# Patient Record
Sex: FEMALE | Race: WHITE | Employment: UNEMPLOYED | URBAN - NONMETROPOLITAN AREA
[De-identification: names, ages, dates, MRNs, and addresses within clinical notes are randomized per-mention and may not be internally consistent; named-entity substitution may affect disease eponyms.]

---

## 2023-07-27 ENCOUNTER — HOSPITAL ENCOUNTER (OUTPATIENT)
Dept: PET IMAGING | Age: 78
Discharge: HOME | End: 2023-07-27
Payer: MEDICARE

## 2023-07-27 DIAGNOSIS — C84.90 NK/T-CELL LYMPHOMA, UNSPECIFIED TYPE (HCC): ICD-10-CM

## 2023-07-27 PROCEDURE — 3430000000 HC RX DIAGNOSTIC RADIOPHARMACEUTICAL: Performed by: INTERNAL MEDICINE

## 2023-07-27 PROCEDURE — 78815 PET IMAGE W/CT SKULL-THIGH: CPT

## 2023-07-27 PROCEDURE — A9552 F18 FDG: HCPCS | Performed by: INTERNAL MEDICINE

## 2023-07-27 RX ORDER — FLUDEOXYGLUCOSE F 18 200 MCI/ML
13.42 INJECTION, SOLUTION INTRAVENOUS
Status: COMPLETED | OUTPATIENT
Start: 2023-07-27 | End: 2023-07-27

## 2023-07-27 RX ADMIN — FLUDEOXYGLUCOSE F 18 13.42 MILLICURIE: 200 INJECTION, SOLUTION INTRAVENOUS at 10:00

## 2023-08-02 ENCOUNTER — HOSPITAL ENCOUNTER (OUTPATIENT)
Dept: NON INVASIVE DIAGNOSTICS | Age: 78
Discharge: HOME | End: 2023-08-02
Payer: MEDICARE

## 2023-08-02 DIAGNOSIS — Z01.818 PRE-OPERATIVE CLEARANCE: ICD-10-CM

## 2023-08-02 LAB
LV EF: 63 %
LVEF MODALITY: NORMAL

## 2023-08-02 PROCEDURE — 93306 TTE W/DOPPLER COMPLETE: CPT

## 2023-10-11 ENCOUNTER — TELEPHONE (OUTPATIENT)
Dept: PULMONOLOGY | Age: 78
End: 2023-10-11

## 2023-10-11 ENCOUNTER — OFFICE VISIT (OUTPATIENT)
Dept: PULMONOLOGY | Age: 78
End: 2023-10-11
Payer: MEDICARE

## 2023-10-11 VITALS
OXYGEN SATURATION: 95 % | HEART RATE: 89 BPM | WEIGHT: 180 LBS | RESPIRATION RATE: 20 BRPM | HEIGHT: 66 IN | SYSTOLIC BLOOD PRESSURE: 122 MMHG | DIASTOLIC BLOOD PRESSURE: 64 MMHG | BODY MASS INDEX: 28.93 KG/M2

## 2023-10-11 DIAGNOSIS — C84.00 MYCOSIS FUNGOIDES, UNSPECIFIED BODY REGION (HCC): ICD-10-CM

## 2023-10-11 DIAGNOSIS — R59.0 MEDIASTINAL LYMPHADENOPATHY: Primary | ICD-10-CM

## 2023-10-11 PROCEDURE — 99205 OFFICE O/P NEW HI 60 MIN: CPT | Performed by: INTERNAL MEDICINE

## 2023-10-11 PROCEDURE — 1123F ACP DISCUSS/DSCN MKR DOCD: CPT | Performed by: INTERNAL MEDICINE

## 2023-10-11 RX ORDER — FLUTICASONE FUROATE, UMECLIDINIUM BROMIDE AND VILANTEROL TRIFENATATE 200; 62.5; 25 UG/1; UG/1; UG/1
1 POWDER RESPIRATORY (INHALATION) DAILY
Qty: 2 EACH | Refills: 0 | Status: SHIPPED | COMMUNITY
Start: 2023-10-11

## 2023-10-11 RX ORDER — CLOPIDOGREL BISULFATE 75 MG/1
75 TABLET ORAL DAILY
COMMUNITY

## 2023-10-11 RX ORDER — FLUTICASONE FUROATE AND VILANTEROL 100; 25 UG/1; UG/1
1 POWDER RESPIRATORY (INHALATION) DAILY
COMMUNITY

## 2023-10-11 RX ORDER — DILTIAZEM HYDROCHLORIDE 120 MG/1
TABLET, FILM COATED ORAL
COMMUNITY
Start: 2023-09-28

## 2023-10-11 RX ORDER — FOLIC ACID 1 MG/1
1 TABLET ORAL DAILY
COMMUNITY

## 2023-10-11 RX ORDER — FLUTICASONE FUROATE, UMECLIDINIUM BROMIDE AND VILANTEROL TRIFENATATE 200; 62.5; 25 UG/1; UG/1; UG/1
1 POWDER RESPIRATORY (INHALATION) DAILY
Qty: 1 EACH | Refills: 3 | Status: SHIPPED | OUTPATIENT
Start: 2023-10-11 | End: 2023-10-12

## 2023-10-11 NOTE — TELEPHONE ENCOUNTER
Spoke with pt, scheduled appt this afternoon with Dr. Ihsan Lofton. [facility] doesn't have any imaging on pt. When I spoke with pt I asked here if she has had any imaging done recently and she could only remember coming to [facility]-which would have been for PET scan.

## 2023-10-11 NOTE — PATIENT INSTRUCTIONS
You have been prescribed a dry powder inhaler. You will take 1 quick deep inhalation 1 time  per day. See instructions below       Remember, for a dry powder inhaler it is a quick, deep inhalation over about 1 second. For a traditional MDI puffer such as albuterol puffers, it is a slow deep inhalation over 3 to 5 seconds. Bronchoscopy has been set up for __________ arrival time __________ at [facility]. Please enter at [facility]. Nothing to eat or drink after midnight prior to the procedure. Must have a  to bring you to and from the procedure. Hold blood thinners as instructed prior to the procedure.

## 2023-10-11 NOTE — TELEPHONE ENCOUNTER
Pt needs to be scheduled for bronch with EBUS. Look for 10/20. Call dept tomorrow to schedule. Pt has prep, just needs date/time and to make a follow up after bronch.

## 2023-10-11 NOTE — TELEPHONE ENCOUNTER
See Dr. Sandi Valverde this week, can offer today. Please ask [facility] if they have any recent CT Chest imaging & to push to PACS (+ send reports) if so    Recent PET CT with bilateral non-calcified nodules with low avidity. Referred to pulm for bronch with EBUS to r/o infectious vs lymphoma involvement. TB blood test advised aong with an ACE level.    Follows with Dr. Aramis Zimmerman for Peripheral T cell non-hodgkins lymphoma, mycosis fungoides   Having increased CAREY & cough, 2D echo non-revealing, EF 65%

## 2023-10-12 NOTE — TELEPHONE ENCOUNTER
Scheduled pt for bronch 10/20/23 at 8:00 am, arrival time 7:00 am.  Spoke with pt and informed with verbal understanding. Need to watch for CT results 10/17/23 for Dr. Marie Lambert to plan bronch.

## 2023-10-12 NOTE — PROGRESS NOTES
..1.  Do not eat or drink anything after 12 midnight prior to surgery. This includes no water, chewing gum or mints, except for bowel prep complete per MD.  2.  Take the following pills with a small sip of water on the morning of surgery 10/20/2023.  3.  Aspirin, Ibuprofen, Advil, Naproxen, Vitamin E and other Anti-inflammatory products should be stopped for 5 days before surgery or as directed by your physician. 4.  Check with your doctor regarding stopping Plavix, Coumadin, Lovenox, Fragmin or other blood thinners. 5.  Do not smoke and do not drink alcoholic beverages 24 hours prior to surgery. This includes NA Beer. 6.  You may brush your teeth and gargle the morning of surgery. DO NOT SWALLOW WATER.  7.  You MUST make arrangements for a responsible adult to take you home after your surgery. You will not be allowed to leave alone or drive yourself home. It is strongly suggested someone stay with you the first 24 hours. Your surgery will be cancelled if you do not have a ride home. 8.  A parent/legal guardian must accompany a child scheduled for surgery and plan to stay at the hospital until the child is discharged. Please do not bring other children with you. 9.  Please wear simple, loose fitting clothing to the hospital.  Gemma Abide not bring valuables ( money, credit cards, checkbooks, etc.)  Do not wear any makeup (including no eye makeup) or nail polish on your fingers or toes. 10.  Do not wear any jewelry or piercing on the day of surgery. All body piercing jewelry must be removed. 11.  If you have dentures, they will be removed before going to the OR; we will provide you a container. If you wear contact lenses or glasses, they will be removed; please bring a case for them.   15.  Notify your Surgeon if you develop any illness between now and surgery time; cough, cold, fever, sore throat, nausea, vomiting, etc.  Please notify your surgeon if you experience dizziness, shortness of breath or blurred vision between now and the time of your surgery. To provide excellent care, visitors will be limited to two in a room at any given time. Please no children under the age of 15 in the surgical department.

## 2023-10-12 NOTE — PROGRESS NOTES
Visit note faxed to PCP and Dr. Jessica Quevedo.
etc..  . The risks and benefits of fiberoptic bronchoscopy were specifically discussed, including the goal of obtaining a diagnosis, the risks of bleeding, infection, pneumothorax, lung collapse, hospitalization and death. We specifically discussed the potential for biopsy and complications related to biopsy. We also discussed the risks of sedation/anesthesia. It is my assessment that the risk of the invasive procedure is outweighed by the benefit. Patient was counseled regarding the recommended procedure, alternatives to the procedure, and possible consequences of not having any evaluation performed.

## 2023-10-13 ENCOUNTER — ANESTHESIA EVENT (OUTPATIENT)
Dept: ENDOSCOPY | Age: 78
End: 2023-10-13
Payer: MEDICARE

## 2023-10-19 ENCOUNTER — HOSPITAL ENCOUNTER (OUTPATIENT)
Age: 78
Discharge: HOME | End: 2023-10-19
Payer: MEDICARE

## 2023-10-19 ENCOUNTER — HOSPITAL ENCOUNTER (OUTPATIENT)
Dept: CT IMAGING | Age: 78
Discharge: HOME | End: 2023-10-19
Payer: MEDICARE

## 2023-10-19 DIAGNOSIS — C84.00 MYCOSIS FUNGOIDES, UNSPECIFIED BODY REGION (HCC): ICD-10-CM

## 2023-10-19 DIAGNOSIS — R59.0 MEDIASTINAL LYMPHADENOPATHY: ICD-10-CM

## 2023-10-19 LAB
ALBUMIN SERPL-MCNC: 4.6 G/DL (ref 3.4–5)
ANION GAP SERPL CALCULATED.3IONS-SCNC: 10 MMOL/L (ref 3–16)
BASOPHILS # BLD: 0.1 K/UL (ref 0–0.2)
BASOPHILS NFR BLD: 1 %
BUN SERPL-MCNC: 15 MG/DL (ref 7–20)
CALCIUM SERPL-MCNC: 9.9 MG/DL (ref 8.3–10.6)
CHLORIDE SERPL-SCNC: 101 MMOL/L (ref 99–110)
CO2 SERPL-SCNC: 29 MMOL/L (ref 21–32)
CREAT SERPL-MCNC: 0.7 MG/DL (ref 0.6–1.2)
DEPRECATED RDW RBC AUTO: 13.5 % (ref 12.4–15.4)
EOSINOPHIL # BLD: 0.3 K/UL (ref 0–0.6)
EOSINOPHIL NFR BLD: 5.5 %
GFR SERPLBLD CREATININE-BSD FMLA CKD-EPI: >60 ML/MIN/{1.73_M2}
GLUCOSE SERPL-MCNC: 99 MG/DL (ref 70–99)
HCT VFR BLD AUTO: 41.1 % (ref 36–48)
HGB BLD-MCNC: 14 G/DL (ref 12–16)
LYMPHOCYTES # BLD: 1.3 K/UL (ref 1–5.1)
LYMPHOCYTES NFR BLD: 22.9 %
MCH RBC QN AUTO: 31 PG (ref 26–34)
MCHC RBC AUTO-ENTMCNC: 34.1 G/DL (ref 31–36)
MCV RBC AUTO: 90.8 FL (ref 80–100)
MONOCYTES # BLD: 0.4 K/UL (ref 0–1.3)
MONOCYTES NFR BLD: 7.7 %
NEUTROPHILS # BLD: 3.5 K/UL (ref 1.7–7.7)
NEUTROPHILS NFR BLD: 62.9 %
PHOSPHATE SERPL-MCNC: 4 MG/DL (ref 2.5–4.9)
PLATELET # BLD AUTO: 217 K/UL (ref 135–450)
PMV BLD AUTO: 6.5 FL (ref 5–10.5)
POTASSIUM SERPL-SCNC: 4.7 MMOL/L (ref 3.5–5.1)
RBC # BLD AUTO: 4.53 M/UL (ref 4–5.2)
SODIUM SERPL-SCNC: 140 MMOL/L (ref 136–145)
WBC # BLD AUTO: 5.5 K/UL (ref 4–11)

## 2023-10-19 PROCEDURE — 6360000004 HC RX CONTRAST MEDICATION: Performed by: INTERNAL MEDICINE

## 2023-10-19 PROCEDURE — 71260 CT THORAX DX C+: CPT

## 2023-10-19 PROCEDURE — 80069 RENAL FUNCTION PANEL: CPT

## 2023-10-19 PROCEDURE — 85025 COMPLETE CBC W/AUTO DIFF WBC: CPT

## 2023-10-19 PROCEDURE — 36415 COLL VENOUS BLD VENIPUNCTURE: CPT

## 2023-10-19 RX ADMIN — IOMEPROL INJECTION 80 ML: 714 INJECTION, SOLUTION INTRAVASCULAR at 16:57

## 2023-10-19 NOTE — H&P
Fiberoptic bronchoscopy history:     Patient with mycosis funcoides; requires fiberoptic bronchoscopy with biopsy to rule out atypical infections, lymphoma per oncology request.    Patient is allergic to aspirin, codeine, and quinine derivatives. Past Medical History:   Diagnosis Date    Allergic rhinitis     Arthritis     all over    Blood transfusion     with gb surgery    Diaphragmatic hernia without mention of obstruction or gangrene     Hiatal hernia    Esophageal reflux     Gastroesophageal reflux    Hearing loss     Irritable bowel syndrome     Irritable bowel    MVP (mitral valve prolapse)     Screening colonoscopy 2/11    Sinus drainage     Tachyarrhythmia about 30 years    well-controlled with nadol    Wears dentures     Wears glasses        Past Surgical History:   Procedure Laterality Date    BACK SURGERY      cleaned out bones    CARPAL TUNNEL RELEASE Bilateral     CHOLECYSTECTOMY, LAPAROSCOPIC  age 39    COLONOSCOPY      DENTAL SURGERY  2010    lower right attached to jaw    HYSTERECTOMY (CERVIX STATUS UNKNOWN)      UPPER GASTROINTESTINAL ENDOSCOPY  3-       Allergies   Allergen Reactions    Aspirin Swelling    Codeine     Quinine Derivatives Diarrhea and Swelling       No current facility-administered medications on file prior to encounter.      Current Outpatient Medications on File Prior to Encounter   Medication Sig Dispense Refill    clopidogrel (PLAVIX) 75 MG tablet Take 1 tablet by mouth daily Not currently taking (Patient not taking: Reported on 61/76/5925)      folic acid (FOLVITE) 1 MG tablet Take 1 tablet by mouth daily      B Complex Vitamins (B-COMPLEX/B-12 PO) Take by mouth      dilTIAZem (CARDIZEM) 120 MG tablet       fluticasone furoate-vilanterol (BREO ELLIPTA) 100-25 MCG/ACT inhaler Inhale 1 puff into the lungs daily      Chlorpheniramine Maleate (ALLERGY PO) Take 2 tablets by mouth daily      fluticasone-umeclidin-vilant (TRELEGY ELLIPTA) 200-62.5-25 MCG/ACT AEPB inhaler Inhale 1 puff into the lungs daily 2 each 0    metoprolol (TOPROL-XL) 50 MG XL tablet Take 1 tablet by mouth 3 times daily      pantoprazole (PROTONIX) 40 MG tablet Take 1 tablet by mouth daily 30 tablet 3    fluticasone (FLONASE) 50 MCG/ACT nasal spray 2 sprays by Nasal route daily      etodolac (LODINE XL) 400 MG SR tablet Take 400 mg by mouth 2 times daily. sertraline (ZOLOFT) 50 MG tablet Take 1.5 tablets by mouth daily          reports that she has never smoked. She has never used smokeless tobacco.    family history includes Cancer in her brother and daughter. Height 1.676 m (5' 6\"), weight 81.6 kg (180 lb), not currently breastfeeding. HENT: Airway patent and reviewed. Mallampati 2  Cardiovascular: Normal rate, regular rhythm, normal heart sounds. Pulmonary/Chest: No increased work of breathing. CTA bilaterally  Abdominal: Soft. No distension. ASA CLASS      III. Severe Systemic Disease      Sedation plan: Anesthesia      Post Procedure Plan   Return to same level of care   ______________________     The risks and benefits of fiberoptic bronchoscopy were specifically discussed, including the goal of obtaining a diagnosis, the risks of bleeding, infection, pneumothorax, lung collapse, hospitalization and death. We specifically discussed the potential for biopsy and complications related to biopsy. We also discussed the risks of sedation/anesthesia. It is my assessment that the risk of the invasive procedure is outweighed by the benefit. Patient was counseled regarding the recommended procedure, alternatives to the procedure, and possible consequences of not having any evaluation performed.

## 2023-10-19 NOTE — TELEPHONE ENCOUNTER
Pt forgot about Ct scan and it wasn't done. Spoke with pt and was able to reschedule for today at 4 pm at [facility].

## 2023-10-20 ENCOUNTER — ANESTHESIA (OUTPATIENT)
Dept: ENDOSCOPY | Age: 78
End: 2023-10-20
Payer: MEDICARE

## 2023-10-20 ENCOUNTER — HOSPITAL ENCOUNTER (OUTPATIENT)
Age: 78
Setting detail: OUTPATIENT SURGERY
Discharge: HOME | End: 2023-10-20
Attending: INTERNAL MEDICINE | Admitting: INTERNAL MEDICINE
Payer: MEDICARE

## 2023-10-20 VITALS
RESPIRATION RATE: 15 BRPM | WEIGHT: 180 LBS | BODY MASS INDEX: 28.93 KG/M2 | SYSTOLIC BLOOD PRESSURE: 114 MMHG | TEMPERATURE: 97.2 F | OXYGEN SATURATION: 100 % | HEIGHT: 66 IN | DIASTOLIC BLOOD PRESSURE: 68 MMHG | HEART RATE: 60 BPM

## 2023-10-20 PROBLEM — C84.00 MYCOSIS FUNGOIDES (HCC): Status: ACTIVE | Noted: 2023-10-20

## 2023-10-20 PROBLEM — R59.0 MEDIASTINAL LYMPHADENOPATHY: Status: ACTIVE | Noted: 2023-10-20

## 2023-10-20 LAB
ANION GAP SERPL CALCULATED.3IONS-SCNC: 11 MMOL/L (ref 3–16)
APPEARANCE BRONCH: ABNORMAL
BUN SERPL-MCNC: 14 MG/DL (ref 7–20)
CALCIUM SERPL-MCNC: 9.2 MG/DL (ref 8.3–10.6)
CHLORIDE SERPL-SCNC: 103 MMOL/L (ref 99–110)
CLOT SPEC QL: ABNORMAL
CO2 SERPL-SCNC: 24 MMOL/L (ref 21–32)
COLOR BRONCH: ABNORMAL
CREAT SERPL-MCNC: 0.6 MG/DL (ref 0.6–1.2)
GFR SERPLBLD CREATININE-BSD FMLA CKD-EPI: >60 ML/MIN/{1.73_M2}
GLUCOSE SERPL-MCNC: 96 MG/DL (ref 70–99)
LYMPHOCYTES NFR BRONCH MANUAL: 24 % (ref 5–10)
MACROPHAGES NFR BRONCH MANUAL: 37 % (ref 90–95)
NEUTROPHILS NFR BRONCH MANUAL: 39 % (ref 5–10)
NUC CELL # BRONCH MANUAL: 78 /CUMM
POTASSIUM SERPL-SCNC: 3.8 MMOL/L (ref 3.5–5.1)
RBC # FLD MANUAL: ABNORMAL /CUMM
SODIUM SERPL-SCNC: 138 MMOL/L (ref 136–145)
TOTAL CELLS COUNTED BRONCH: 100

## 2023-10-20 PROCEDURE — 80048 BASIC METABOLIC PNL TOTAL CA: CPT

## 2023-10-20 PROCEDURE — 87186 SC STD MICRODIL/AGAR DIL: CPT

## 2023-10-20 PROCEDURE — 31624 DX BRONCHOSCOPE/LAVAGE: CPT | Performed by: INTERNAL MEDICINE

## 2023-10-20 PROCEDURE — 7100000011 HC PHASE II RECOVERY - ADDTL 15 MIN: Performed by: INTERNAL MEDICINE

## 2023-10-20 PROCEDURE — 87070 CULTURE OTHR SPECIMN AEROBIC: CPT

## 2023-10-20 PROCEDURE — 88177 CYTP FNA EVAL EA ADDL: CPT

## 2023-10-20 PROCEDURE — 88184 FLOWCYTOMETRY/ TC 1 MARKER: CPT

## 2023-10-20 PROCEDURE — 87205 SMEAR GRAM STAIN: CPT

## 2023-10-20 PROCEDURE — 88173 CYTOPATH EVAL FNA REPORT: CPT

## 2023-10-20 PROCEDURE — 6360000002 HC RX W HCPCS: Performed by: NURSE ANESTHETIST, CERTIFIED REGISTERED

## 2023-10-20 PROCEDURE — 89051 BODY FLUID CELL COUNT: CPT

## 2023-10-20 PROCEDURE — 87206 SMEAR FLUORESCENT/ACID STAI: CPT

## 2023-10-20 PROCEDURE — 3700000001 HC ADD 15 MINUTES (ANESTHESIA): Performed by: INTERNAL MEDICINE

## 2023-10-20 PROCEDURE — 3603165200 HC BRNCHSC EBUS GUIDED SAMPL 1/2 NODE STATION/STRUX: Performed by: INTERNAL MEDICINE

## 2023-10-20 PROCEDURE — 2709999900 HC NON-CHARGEABLE SUPPLY: Performed by: INTERNAL MEDICINE

## 2023-10-20 PROCEDURE — 87116 MYCOBACTERIA CULTURE: CPT

## 2023-10-20 PROCEDURE — 87102 FUNGUS ISOLATION CULTURE: CPT

## 2023-10-20 PROCEDURE — 3609010800 HC BRONCHOSCOPY ALVEOLAR LAVAGE: Performed by: INTERNAL MEDICINE

## 2023-10-20 PROCEDURE — 7100000001 HC PACU RECOVERY - ADDTL 15 MIN: Performed by: INTERNAL MEDICINE

## 2023-10-20 PROCEDURE — 7100000000 HC PACU RECOVERY - FIRST 15 MIN: Performed by: INTERNAL MEDICINE

## 2023-10-20 PROCEDURE — 2720000010 HC SURG SUPPLY STERILE: Performed by: INTERNAL MEDICINE

## 2023-10-20 PROCEDURE — 3609011200 HC BRONCHOSCOPY W/TRANSBRONCL NDL ASPIR BX EA ADDL LOBE: Performed by: INTERNAL MEDICINE

## 2023-10-20 PROCEDURE — 7100000010 HC PHASE II RECOVERY - FIRST 15 MIN: Performed by: INTERNAL MEDICINE

## 2023-10-20 PROCEDURE — 88112 CYTOPATH CELL ENHANCE TECH: CPT

## 2023-10-20 PROCEDURE — 2500000003 HC RX 250 WO HCPCS: Performed by: NURSE ANESTHETIST, CERTIFIED REGISTERED

## 2023-10-20 PROCEDURE — 87181 SC STD AGAR DILUTION PER AGT: CPT

## 2023-10-20 PROCEDURE — 88185 FLOWCYTOMETRY/TC ADD-ON: CPT

## 2023-10-20 PROCEDURE — 2500000003 HC RX 250 WO HCPCS: Performed by: ANESTHESIOLOGY

## 2023-10-20 PROCEDURE — 88312 SPECIAL STAINS GROUP 1: CPT

## 2023-10-20 PROCEDURE — 36415 COLL VENOUS BLD VENIPUNCTURE: CPT

## 2023-10-20 PROCEDURE — 3700000000 HC ANESTHESIA ATTENDED CARE: Performed by: INTERNAL MEDICINE

## 2023-10-20 PROCEDURE — 31653 BRONCH EBUS SAMPLNG 3/> NODE: CPT | Performed by: INTERNAL MEDICINE

## 2023-10-20 PROCEDURE — 3609011900 HC BRONCHOSCOPY NEEDLE BX TRACHEA MAIN STEM&/BRON: Performed by: INTERNAL MEDICINE

## 2023-10-20 PROCEDURE — 87077 CULTURE AEROBIC IDENTIFY: CPT

## 2023-10-20 PROCEDURE — 88172 CYTP DX EVAL FNA 1ST EA SITE: CPT

## 2023-10-20 PROCEDURE — 88305 TISSUE EXAM BY PATHOLOGIST: CPT

## 2023-10-20 RX ORDER — LABETALOL HYDROCHLORIDE 5 MG/ML
5 INJECTION, SOLUTION INTRAVENOUS EVERY 10 MIN PRN
Status: DISCONTINUED | OUTPATIENT
Start: 2023-10-20 | End: 2023-10-20 | Stop reason: HOSPADM

## 2023-10-20 RX ORDER — LIDOCAINE HYDROCHLORIDE 20 MG/ML
INJECTION, SOLUTION EPIDURAL; INFILTRATION; INTRACAUDAL; PERINEURAL PRN
Status: DISCONTINUED | OUTPATIENT
Start: 2023-10-20 | End: 2023-10-20 | Stop reason: SDUPTHER

## 2023-10-20 RX ORDER — SODIUM CHLORIDE 0.9 % (FLUSH) 0.9 %
5-40 SYRINGE (ML) INJECTION EVERY 12 HOURS SCHEDULED
Status: DISCONTINUED | OUTPATIENT
Start: 2023-10-20 | End: 2023-10-20 | Stop reason: HOSPADM

## 2023-10-20 RX ORDER — ONDANSETRON 2 MG/ML
INJECTION INTRAMUSCULAR; INTRAVENOUS PRN
Status: DISCONTINUED | OUTPATIENT
Start: 2023-10-20 | End: 2023-10-20 | Stop reason: SDUPTHER

## 2023-10-20 RX ORDER — ROCURONIUM BROMIDE 10 MG/ML
INJECTION, SOLUTION INTRAVENOUS PRN
Status: DISCONTINUED | OUTPATIENT
Start: 2023-10-20 | End: 2023-10-20 | Stop reason: SDUPTHER

## 2023-10-20 RX ORDER — MEPERIDINE HYDROCHLORIDE 25 MG/ML
12.5 INJECTION INTRAMUSCULAR; INTRAVENOUS; SUBCUTANEOUS EVERY 5 MIN PRN
Status: DISCONTINUED | OUTPATIENT
Start: 2023-10-20 | End: 2023-10-20 | Stop reason: HOSPADM

## 2023-10-20 RX ORDER — SODIUM CHLORIDE, SODIUM LACTATE, POTASSIUM CHLORIDE, CALCIUM CHLORIDE 600; 310; 30; 20 MG/100ML; MG/100ML; MG/100ML; MG/100ML
INJECTION, SOLUTION INTRAVENOUS CONTINUOUS
Status: DISCONTINUED | OUTPATIENT
Start: 2023-10-20 | End: 2023-10-20 | Stop reason: HOSPADM

## 2023-10-20 RX ORDER — DIPHENHYDRAMINE HYDROCHLORIDE 50 MG/ML
12.5 INJECTION INTRAMUSCULAR; INTRAVENOUS
Status: DISCONTINUED | OUTPATIENT
Start: 2023-10-20 | End: 2023-10-20 | Stop reason: HOSPADM

## 2023-10-20 RX ORDER — SODIUM CHLORIDE 9 MG/ML
INJECTION, SOLUTION INTRAVENOUS PRN
Status: DISCONTINUED | OUTPATIENT
Start: 2023-10-20 | End: 2023-10-20 | Stop reason: HOSPADM

## 2023-10-20 RX ORDER — SODIUM CHLORIDE 0.9 % (FLUSH) 0.9 %
5-40 SYRINGE (ML) INJECTION PRN
Status: DISCONTINUED | OUTPATIENT
Start: 2023-10-20 | End: 2023-10-20 | Stop reason: HOSPADM

## 2023-10-20 RX ORDER — ONDANSETRON 2 MG/ML
4 INJECTION INTRAMUSCULAR; INTRAVENOUS
Status: DISCONTINUED | OUTPATIENT
Start: 2023-10-20 | End: 2023-10-20 | Stop reason: HOSPADM

## 2023-10-20 RX ORDER — DEXAMETHASONE SODIUM PHOSPHATE 10 MG/ML
INJECTION, SOLUTION INTRAMUSCULAR; INTRAVENOUS PRN
Status: DISCONTINUED | OUTPATIENT
Start: 2023-10-20 | End: 2023-10-20 | Stop reason: SDUPTHER

## 2023-10-20 RX ORDER — PROPOFOL 10 MG/ML
INJECTION, EMULSION INTRAVENOUS PRN
Status: DISCONTINUED | OUTPATIENT
Start: 2023-10-20 | End: 2023-10-20 | Stop reason: SDUPTHER

## 2023-10-20 RX ORDER — OXYCODONE HYDROCHLORIDE 5 MG/1
5 TABLET ORAL PRN
Status: DISCONTINUED | OUTPATIENT
Start: 2023-10-20 | End: 2023-10-20 | Stop reason: HOSPADM

## 2023-10-20 RX ORDER — FAMOTIDINE 10 MG/ML
20 INJECTION, SOLUTION INTRAVENOUS ONCE
Status: COMPLETED | OUTPATIENT
Start: 2023-10-20 | End: 2023-10-20

## 2023-10-20 RX ORDER — OXYCODONE HYDROCHLORIDE 5 MG/1
10 TABLET ORAL PRN
Status: DISCONTINUED | OUTPATIENT
Start: 2023-10-20 | End: 2023-10-20 | Stop reason: HOSPADM

## 2023-10-20 RX ADMIN — FAMOTIDINE 20 MG: 10 INJECTION, SOLUTION INTRAVENOUS at 08:01

## 2023-10-20 RX ADMIN — LIDOCAINE HYDROCHLORIDE 3 ML: 20 INJECTION, SOLUTION EPIDURAL; INFILTRATION; INTRACAUDAL; PERINEURAL at 08:09

## 2023-10-20 RX ADMIN — DEXAMETHASONE SODIUM PHOSPHATE 8 MG: 10 INJECTION, SOLUTION INTRAMUSCULAR; INTRAVENOUS at 08:19

## 2023-10-20 RX ADMIN — ONDANSETRON HYDROCHLORIDE 4 MG: 2 INJECTION, SOLUTION INTRAMUSCULAR; INTRAVENOUS at 09:16

## 2023-10-20 RX ADMIN — PROPOFOL 130 MG: 10 INJECTION, EMULSION INTRAVENOUS at 08:09

## 2023-10-20 RX ADMIN — ROCURONIUM BROMIDE 50 MG: 10 INJECTION, SOLUTION INTRAVENOUS at 08:09

## 2023-10-20 ASSESSMENT — PAIN - FUNCTIONAL ASSESSMENT: PAIN_FUNCTIONAL_ASSESSMENT: NONE - DENIES PAIN

## 2023-10-20 NOTE — PROCEDURES
PROCEDURE: Fiberoptic bronchoscopy with endobronchial ultrasound-guided biopsy of lymph nodes    DESCRIPTION OF PROCEDURE: Informed consent was obtained from the patient after explaining the risks and benefits. A time out was taken. Anesthesia was kindly provided by the anesthesia team.    The scope was passed with ease via the endotracheal tube. Direct visualization of the lymph nodes was obtained using endobronchial ultrasound (EBUS) guidance. A complete ultrasound lymph node exam was performed for lymph node stations 4, 7, 10 and 11. The following lymph nodes were subjected to EBUS guided biopsy using standard technique and in the following order:    1.  7 (approximately 1 cm in short axis)  2.  4R (approximately 0.75 cm in short axis)  3.  11R (approximately 1 cm in short axis)    Subsequently, a standard bronchoscope was used to perform a complete airway inspection. 1.  Washings were obtained from throughout the airways. 2.  A bronchoalveolar lavage was obtained from the right upper lobe     Rapid on site evaluation of tissue was performed by pathologist.  Lymphocytes and anthracotic pigment was seen on the slides from specimens 1&2, indicating lymphatic tissue. Samples were sent for cytology, flow cytometry and culture. The patient tolerated the procedure well. EBL less than 10 mL. Recovery will be per the anesthesia team.      FOLLOW UP:  is with me in approximately three to five days. Patient is instructed to call with concerns and if follow up has not already been scheduled. In the event of severe symptoms or if the patient is unable to reach my office, instructions are given to proceed to the emergency department.

## 2023-10-20 NOTE — ANESTHESIA POSTPROCEDURE EVALUATION
HGB                      14.0                10/19/2023 03:40 PM        HCT                      41.1                10/19/2023 03:40 PM        PLT                      217                 10/19/2023 03:40 PM   RENAL  Lab Results       Component                Value               Date/Time                  NA                       138                 10/20/2023 08:02 AM        K                        3.8                 10/20/2023 08:02 AM        CL                       103                 10/20/2023 08:02 AM        CO2                      24                  10/20/2023 08:02 AM        BUN                      14                  10/20/2023 08:02 AM        CREATININE               0.6                 10/20/2023 08:02 AM        GLUCOSE                  96                  10/20/2023 08:02 AM   COAGS  Lab Results       Component                Value               Date/Time                  PROTIME                  9.9                 09/23/2014 12:22 PM        INR                      0.92                09/23/2014 12:22 PM        APTT                     28.3                09/23/2014 12:22 PM     Intake & Output:  @75ZDKV@    Nausea & Vomiting:  No    Level of Consciousness:  Awake    Pain Assessment:  Adequate analgesia    Anesthesia Complications:  No apparent anesthetic complications    SUMMARY      Vital signs stable  OK to discharge from Stage I post anesthesia care.   Care transferred from Anesthesiology department on discharge from perioperative area

## 2023-10-20 NOTE — PROGRESS NOTES
Discharge instructions given to patient's  Noah Anna at this time, he states understanding and denies any questions.

## 2023-10-21 LAB
ACID FAST STN SPEC QL: NORMAL
LOEFFLER MB STN SPEC: NORMAL

## 2023-10-22 LAB
BACTERIA SPEC RESP CULT: ABNORMAL
BACTERIA SPEC RESP CULT: ABNORMAL
BACTERIA SPEC RESP CULT: NORMAL
GRAM STN SPEC: ABNORMAL
GRAM STN SPEC: NORMAL
ORGANISM: ABNORMAL

## 2023-10-24 ENCOUNTER — OFFICE VISIT (OUTPATIENT)
Dept: PULMONOLOGY | Age: 78
End: 2023-10-24
Payer: MEDICARE

## 2023-10-24 VITALS
HEART RATE: 84 BPM | RESPIRATION RATE: 16 BRPM | HEIGHT: 66 IN | WEIGHT: 176 LBS | BODY MASS INDEX: 28.28 KG/M2 | OXYGEN SATURATION: 97 % | DIASTOLIC BLOOD PRESSURE: 68 MMHG | SYSTOLIC BLOOD PRESSURE: 110 MMHG

## 2023-10-24 DIAGNOSIS — R91.1 PULMONARY NODULE: Primary | ICD-10-CM

## 2023-10-24 LAB
BACTERIA SPEC RESP CULT: ABNORMAL
BACTERIA SPEC RESP CULT: ABNORMAL
GRAM STN SPEC: ABNORMAL
ORGANISM: ABNORMAL

## 2023-10-24 PROCEDURE — 1123F ACP DISCUSS/DSCN MKR DOCD: CPT | Performed by: INTERNAL MEDICINE

## 2023-10-24 PROCEDURE — 99214 OFFICE O/P EST MOD 30 MIN: CPT | Performed by: INTERNAL MEDICINE

## 2023-10-24 NOTE — PROGRESS NOTES
PULMONARY, CRITICAL CARE AND SLEEP MEDICINE     CC/REFERRING PROVIDER:  Patient is being seen at the request of Dr. Savi Louis for a consultation for abnormal CTPET      Interval History 10/24/23  - feels weak and poorly overall, maybe worse since anesthesia; has significant nighttime cough, doesn't think breo helps, trelegy caused reflux she thinks. PRESENTING HPI 10/11/23 : 78 yo with T cell lymphoma/mycosis fungoides who is going to be treated with immunosuppressing therapies, we are asked to evaluate for possible NHL, AFB, other infection. reports that she has never smoked. She has never used smokeless tobacco.      PHYSICAL EXAM:  Blood pressure 110/68, pulse 84, resp. rate 16, height 1.676 m (5' 6\"), weight 79.8 kg (176 lb), SpO2 97 %, not currently breastfeeding.'  CONSTITUTIONAL:  No acute distress. HEENT:  PERRL. Oropharynx is clear and moist.   CV: Normal S1, S2. No lower extremity edema. PULM/CHEST: No wheezes. No rales. Chest wall is not dull to percussion. No accessory muscle usage or stridor. ABDOMINAL:   No distension   MUSCULOSKELETAL: No cyanosis. No clubbing. No obvious joint deformity. LYMPHATIC: No cervical or supraclavicular adenopathy. SKIN: Skin is warm and dry. Rash of MF on LE   PSYCHIATRIC: Normal mood and affect. NEUROLOGIC: Alert, awake and oriented. Speech fluent    DATA:   - PFTs -       - Imaging -   CT PET 7/27/23 personally reviewed   CHEST: Trace pericardial effusion. Calcification of the thoracic aorta. Calcified mediastinal lymph nodes    Biapical pleuroparenchymal thickening. Tree-in-bud type nodularity within  the right upper lobe is similar to prior, suggestive of bronchiolitis. 6 mm anterior left apical pulmonary nodule is similar to prior. 3 mm peripheral subpleural left upper lobe nodule appears new though is too small to characterize by PET. IMPRESSION:  No hypermetabolic adenopathy, with regard to the given history of lymphoma.    Several

## 2023-10-24 NOTE — RESULT ENCOUNTER NOTE
Please forward bacterial/AFB/fungal culture prelim results to [facility], please send Flow and cytology to [facility]     Please tell pt that her pathology results all look okay, no sign of cancer or lymphoma. Also, her cultures looking at infections that would prevent her from treatments with  [facility] are so far negative. We will continue to follow those cultures for 4 additional weeks and notify her if there are any changes. There was a very small amount of a bacteria present that sometimes can cause pneumonia, so I would like to treat that with a short course of levaquin.   I sent the rx in

## 2023-10-25 ENCOUNTER — TELEPHONE (OUTPATIENT)
Dept: PULMONOLOGY | Age: 78
End: 2023-10-25

## 2023-10-25 DIAGNOSIS — R91.1 PULMONARY NODULE: Primary | ICD-10-CM

## 2023-10-25 NOTE — TELEPHONE ENCOUNTER
Faxed visit note and testing to Dr. Laurie Presley. Need to inform pt of Dr. Deng Her message and schedule appropriate Ct and follow up. Message  Received: Roselia Mercer, MD Kailash Castro MA; [place], [place], [place]; [place], [place]  CC: Laurie Presley on my note and letter from today, all cytology results, culture results     Please tell patient I would like her to schedule a f/u CT CHEST no IV dye to follow the small Pulmonary Nodules that have been stable. It is likely she will have repeat CT imaging with Dr. Laurie Presley and if she does have a CT within the next 12 months, she should notify us because we may be able to cancel this f/u imaging.   Tell her we have sent all the results on to Dr. Laurie Presley

## 2023-10-30 LAB
FUNGUS SPEC CULT: NORMAL
LOEFFLER MB STN SPEC: NORMAL

## 2023-10-31 LAB
ACID FAST STN SPEC QL: NORMAL
MYCOBACTERIUM SPEC CULT: NORMAL

## 2023-11-06 LAB
FUNGUS SPEC CULT: NORMAL
LOEFFLER MB STN SPEC: NORMAL

## 2023-11-07 LAB
ACID FAST STN SPEC QL: NORMAL
MYCOBACTERIUM SPEC CULT: NORMAL

## 2023-11-13 ENCOUNTER — HOSPITAL ENCOUNTER (OUTPATIENT)
Dept: VASCULAR LAB | Age: 78
Discharge: HOME | End: 2023-11-13
Payer: MEDICARE

## 2023-11-13 ENCOUNTER — HOSPITAL ENCOUNTER (OUTPATIENT)
Dept: MRI IMAGING | Age: 78
Discharge: HOME | End: 2023-11-13
Payer: MEDICARE

## 2023-11-13 DIAGNOSIS — R42 DIZZINESS: ICD-10-CM

## 2023-11-13 DIAGNOSIS — C84.A0 CUTANEOUS T-CELL LYMPHOMA, UNSPECIFIED BODY REGION (HCC): ICD-10-CM

## 2023-11-13 LAB
FUNGUS SPEC CULT: NORMAL
LOEFFLER MB STN SPEC: NORMAL

## 2023-11-13 PROCEDURE — 93880 EXTRACRANIAL BILAT STUDY: CPT

## 2023-11-13 PROCEDURE — 70553 MRI BRAIN STEM W/O & W/DYE: CPT

## 2023-11-13 PROCEDURE — 6360000004 HC RX CONTRAST MEDICATION: Performed by: INTERNAL MEDICINE

## 2023-11-13 PROCEDURE — A9579 GAD-BASE MR CONTRAST NOS,1ML: HCPCS | Performed by: INTERNAL MEDICINE

## 2023-11-13 RX ADMIN — GADOTERIDOL 15 ML: 279.3 INJECTION, SOLUTION INTRAVENOUS at 11:05

## 2023-11-14 LAB
ACID FAST STN SPEC QL: NORMAL
MYCOBACTERIUM SPEC CULT: NORMAL

## 2023-11-20 LAB
FUNGUS SPEC CULT: NORMAL
LOEFFLER MB STN SPEC: NORMAL

## 2023-11-21 LAB
ACID FAST STN SPEC QL: NORMAL
MYCOBACTERIUM SPEC CULT: NORMAL

## 2023-11-21 NOTE — PROGRESS NOTES
Called and spoke to Patient about upcoming procedure. Phone number used: [phone]  Procedure: Port placement  Approving Radiologist: N/a     Pt informed of the following:  Date of procedure: 11/27/2023  Arrival time of procedure: 1000  Time of procedure: 1200  Check in at same day surgery blue awning entrance prior to procedure. On any blood thinners? No. If yes: N/A  Instructed to hold: N/A     Blood pressure medication? No. If yes, take the morning of procedure. Any issues with sedation in the past? No  Will receive versed and fentanyl for conscious sedation and will need a  day of procedure. Post-procedure recovery will be here 1 hour. Nothing to eat or drink after midnight. Need COVID testing prior: No     Need SDS: Yes    Pre-procedure orders placed.   Lab ordered: CBC and PT/INR

## 2023-11-27 ENCOUNTER — HOSPITAL ENCOUNTER (OUTPATIENT)
Age: 78
Discharge: HOME | End: 2023-11-27
Payer: MEDICARE

## 2023-11-27 ENCOUNTER — HOSPITAL ENCOUNTER (OUTPATIENT)
Dept: INTERVENTIONAL RADIOLOGY/VASCULAR | Age: 78
Discharge: HOME | End: 2023-11-27
Payer: MEDICARE

## 2023-11-27 VITALS
TEMPERATURE: 96.8 F | WEIGHT: 179 LBS | HEIGHT: 66 IN | BODY MASS INDEX: 28.77 KG/M2 | SYSTOLIC BLOOD PRESSURE: 125 MMHG | OXYGEN SATURATION: 96 % | DIASTOLIC BLOOD PRESSURE: 80 MMHG | HEART RATE: 57 BPM | RESPIRATION RATE: 12 BRPM

## 2023-11-27 DIAGNOSIS — C84.A0 CUTANEOUS T-CELL LYMPHOMA, UNSPECIFIED BODY REGION (HCC): ICD-10-CM

## 2023-11-27 LAB
DEPRECATED RDW RBC AUTO: 14.5 % (ref 12.4–15.4)
HCT VFR BLD AUTO: 42.6 % (ref 36–48)
HGB BLD-MCNC: 14.2 G/DL (ref 12–16)
INR PPP: 0.92 (ref 0.84–1.16)
MCH RBC QN AUTO: 31.3 PG (ref 26–34)
MCHC RBC AUTO-ENTMCNC: 33.4 G/DL (ref 31–36)
MCV RBC AUTO: 93.6 FL (ref 80–100)
PLATELET # BLD AUTO: 210 K/UL (ref 135–450)
PMV BLD AUTO: 6.5 FL (ref 5–10.5)
PROTHROMBIN TIME: 12.3 SEC (ref 11.5–14.8)
RBC # BLD AUTO: 4.55 M/UL (ref 4–5.2)
WBC # BLD AUTO: 13.3 K/UL (ref 4–11)

## 2023-11-27 PROCEDURE — 77001 FLUOROGUIDE FOR VEIN DEVICE: CPT

## 2023-11-27 PROCEDURE — 99152 MOD SED SAME PHYS/QHP 5/>YRS: CPT

## 2023-11-27 PROCEDURE — 85610 PROTHROMBIN TIME: CPT

## 2023-11-27 PROCEDURE — 99153 MOD SED SAME PHYS/QHP EA: CPT

## 2023-11-27 PROCEDURE — 7100000011 HC PHASE II RECOVERY - ADDTL 15 MIN

## 2023-11-27 PROCEDURE — 85027 COMPLETE CBC AUTOMATED: CPT

## 2023-11-27 PROCEDURE — 36561 INSERT TUNNELED CV CATH: CPT

## 2023-11-27 PROCEDURE — 76937 US GUIDE VASCULAR ACCESS: CPT

## 2023-11-27 PROCEDURE — 6360000002 HC RX W HCPCS: Performed by: RADIOLOGY

## 2023-11-27 PROCEDURE — 7100000010 HC PHASE II RECOVERY - FIRST 15 MIN

## 2023-11-27 PROCEDURE — 36415 COLL VENOUS BLD VENIPUNCTURE: CPT

## 2023-11-27 PROCEDURE — 2580000003 HC RX 258: Performed by: RADIOLOGY

## 2023-11-27 PROCEDURE — C1788 PORT, INDWELLING, IMP: HCPCS

## 2023-11-27 RX ORDER — SODIUM CHLORIDE 0.9 % (FLUSH) 0.9 %
5-40 SYRINGE (ML) INJECTION EVERY 12 HOURS SCHEDULED
Status: DISCONTINUED | OUTPATIENT
Start: 2023-11-27 | End: 2023-11-28 | Stop reason: HOSPADM

## 2023-11-27 RX ORDER — MIDAZOLAM HYDROCHLORIDE 1 MG/ML
INJECTION INTRAMUSCULAR; INTRAVENOUS PRN
Status: COMPLETED | OUTPATIENT
Start: 2023-11-27 | End: 2023-11-27

## 2023-11-27 RX ORDER — FENTANYL CITRATE 50 UG/ML
INJECTION, SOLUTION INTRAMUSCULAR; INTRAVENOUS PRN
Status: COMPLETED | OUTPATIENT
Start: 2023-11-27 | End: 2023-11-27

## 2023-11-27 RX ORDER — SODIUM CHLORIDE 0.9 % (FLUSH) 0.9 %
5-40 SYRINGE (ML) INJECTION PRN
Status: DISCONTINUED | OUTPATIENT
Start: 2023-11-27 | End: 2023-11-28 | Stop reason: HOSPADM

## 2023-11-27 RX ORDER — SODIUM CHLORIDE 9 MG/ML
INJECTION, SOLUTION INTRAVENOUS PRN
Status: DISCONTINUED | OUTPATIENT
Start: 2023-11-27 | End: 2023-11-28 | Stop reason: HOSPADM

## 2023-11-27 RX ADMIN — FENTANYL CITRATE 50 MCG: 50 INJECTION INTRAMUSCULAR; INTRAVENOUS at 12:53

## 2023-11-27 RX ADMIN — MIDAZOLAM 1 MG: 1 INJECTION INTRAMUSCULAR; INTRAVENOUS at 12:53

## 2023-11-27 RX ADMIN — FENTANYL CITRATE 50 MCG: 50 INJECTION INTRAMUSCULAR; INTRAVENOUS at 13:04

## 2023-11-27 RX ADMIN — MIDAZOLAM 1 MG: 1 INJECTION INTRAMUSCULAR; INTRAVENOUS at 13:05

## 2023-11-27 RX ADMIN — SODIUM CHLORIDE 2000 MG: 9 INJECTION, SOLUTION INTRAVENOUS at 12:52

## 2023-11-27 ASSESSMENT — PAIN DESCRIPTION - ORIENTATION: ORIENTATION: OTHER (COMMENT)

## 2023-11-27 ASSESSMENT — PAIN SCALES - GENERAL
PAINLEVEL_OUTOF10: 0

## 2023-11-27 ASSESSMENT — PAIN - FUNCTIONAL ASSESSMENT: PAIN_FUNCTIONAL_ASSESSMENT: 0-10

## 2023-11-27 ASSESSMENT — PAIN DESCRIPTION - DESCRIPTORS: DESCRIPTORS: OTHER (COMMENT)

## 2023-11-27 ASSESSMENT — PAIN DESCRIPTION - FREQUENCY: FREQUENCY: OTHER (COMMENT)

## 2023-11-27 ASSESSMENT — PAIN DESCRIPTION - PAIN TYPE: TYPE: OTHER (COMMENT)

## 2023-11-27 ASSESSMENT — PAIN DESCRIPTION - LOCATION: LOCATION: OTHER (COMMENT)

## 2023-11-27 ASSESSMENT — PAIN DESCRIPTION - ONSET: ONSET: OTHER (COMMENT)

## 2023-11-27 NOTE — DISCHARGE INSTRUCTIONS
Remove dressing in 24 hours. Keep site clean and dry. ANESTHESIA DISCHARGE INSTRUCTIONS    You are under the influence of drugs- do not drink alcohol, drive a car, operate machinery(such as power tools, kitchen appliances, etc), sign legal documents, or make any important decisions for 24 hours (or while on pain medications). Children should not ride bikes or [place] or play on gym sets  for 24 hours after surgery. A responsible adult should be with you for 24 hours. Rest at home today- increase activity as tolerated. Progress slowly to a regular diet unless your physician has instructed you otherwise. Drink plenty of water. CALL YOUR DOCTOR IF YOU:  Have moderate to severe nausea or vomiting AND are unable to hold down fluids or prescribed medications. Have bright red bloody drainage from your dressing that won't stop oozing. Do not get relief with your pain medication    NORMAL (POSSIBLE) SIDE EFFECTS FROM ANESTHESIA:     Confusion, temporary memory loss, delayed reaction times in the first 24 hours  Lightheadedness, dizziness, difficulty focusing, blurred vision  Nausea/vomiting can happen  Shivering, feeling cold, sore throat, cough and muscle aches should stop within 24-48 hours  Trouble urinating - call your surgeon if it has been more than 8 hrs  Bruising or soreness at the IV site - call if it remains red, firm or there is drainage             FEMALES OF CHILDBEARING AGE WHO ARE TAKING BIRTH CONTROL PILLS:  You may have received a medication during your procedure that interferes with the   actions of birth control pills (Bridion or Emend). Use some other kind of birth control in addition to your pills, like a condom, for 1 month after your procedure to prevent unwanted pregnancy. The following instructions are to be followed if you have a known history or diagnosis of sleep apnea:   For all sleep apnea patients:  ? Sleep on your side or sitting up in a chair whenever possible, especially

## 2023-11-27 NOTE — PROGRESS NOTES
Patient is a/o x4, refused anything to drink, was able to void prior to discharge. Discharge instructions given to patient and hop. Packet sent home with patient on porf and port care.

## 2023-11-27 NOTE — BRIEF OP NOTE
Brief Postoperative Note    Harsha Richardson  YOB: 1945  [id number]    Pre-operative Diagnosis: Lymphoma     Post-operative Diagnosis: Same    Procedure: Insertion of Portacath    Anesthesia: Moderate Sedation    Surgeons/Assistants: Michelle Gordon MD    Estimated Blood Loss: < 5 ml    Complications: None    Specimens: None    Findings: Successful placement of right IJ Portacath. OK to use.        Electronically signed by Blair Ferraro MD on 11/27/2023 at 1:23 PM

## 2023-11-27 NOTE — PRE SEDATION
Sedation Pre-Procedure Note    Patient Name: Danna Moses   YOB: 1945  Room/Bed: Room/bed info not found  Medical Record Number: [id number]  Date: 11/27/2023   Time: 12:51 PM       Indication:  T cell lymphoma    Consent: I have discussed with the patient and/or the patient representative the indication, alternatives, and the possible risks and/or complications of the planned procedure and the anesthesia methods. The patient and/or patient representative appear to understand and agree to proceed. Vital Signs:   Vitals:    11/27/23 1248   BP: (!) 155/77   Pulse: 61   Resp: 16   Temp:    SpO2: 99%       Past Medical History:   has a past medical history of Allergic rhinitis, Arthritis, Blood transfusion, Diaphragmatic hernia without mention of obstruction or gangrene, Esophageal reflux, Hearing loss, Irritable bowel syndrome, MVP (mitral valve prolapse), Screening colonoscopy, Sinus drainage, Tachyarrhythmia, Wears dentures, and Wears glasses. Past Surgical History:   has a past surgical history that includes Cholecystectomy, laparoscopic (age 39); Dental surgery (01/01/2010); back surgery; Colonoscopy; Hysterectomy; Carpal tunnel release (Bilateral); Upper gastrointestinal endoscopy (03/22/2016); other surgical history (10/20/2023); bronchoscopy (N/A, 10/20/2023); bronchoscopy (10/20/2023); bronchoscopy (10/20/2023); and bronchoscopy (10/20/2023). Medications:   Scheduled Meds:    sodium chloride flush  5-40 mL IntraVENous 2 times per day    ceFAZolin  2,000 mg IntraVENous Once     Continuous Infusions:    sodium chloride       PRN Meds: sodium chloride flush, sodium chloride  Home Meds:   Prior to Admission medications    Medication Sig Start Date End Date Taking?  Authorizing Provider   clopidogrel (PLAVIX) 75 MG tablet Take 1 tablet by mouth daily Not currently taking  Patient not taking: Reported on 11/27/2023    Provider, MD Lee   folic acid (FOLVITE) 1 MG tablet Take 1 tablet by

## 2023-11-27 NOTE — PROGRESS NOTES
Port placed to right chest, patient tolerated well. Site dressing c/d/I. Bedside report given to Victor Hugo Gayle RN. Care transferred.   Dane Collins RN

## 2023-11-28 LAB
ACID FAST STN SPEC QL: NORMAL
MYCOBACTERIUM SPEC CULT: NORMAL

## 2023-12-05 LAB
ACID FAST STN SPEC QL: NORMAL
MYCOBACTERIUM SPEC CULT: NORMAL

## (undated) DEVICE — NEBULIZER AEROSOL TBNG 7 FT FOR ACUTE CARE NEBUTECH

## (undated) DEVICE — SHEET,DRAPE,40X58,STERILE: Brand: MEDLINE

## (undated) DEVICE — SYRINGE MED 10ML SLIP TIP BLNT FILL AND LUERLOCK DISP

## (undated) DEVICE — NEEDLE ASPIR 21GA L700MM US GUID TREAT DST END FOR EFFICIENT

## (undated) DEVICE — SINGLE USE BIOPSY VALVE MAJ-210: Brand: SINGLE USE BIOPSY VALVE (STERILE)

## (undated) DEVICE — SYRINGE MED 50ML LUERSLIP TIP

## (undated) DEVICE — AIRLIFE™ ADULT AEROSOL MASK VINYL, UNDER-THE-CHIN STYLE: Brand: AIRLIFE™

## (undated) DEVICE — SINGLE USE SUCTION VALVE MAJ-209: Brand: SINGLE USE SUCTION VALVE (STERILE)